# Patient Record
Sex: MALE | Race: WHITE | NOT HISPANIC OR LATINO | Employment: OTHER | URBAN - METROPOLITAN AREA
[De-identification: names, ages, dates, MRNs, and addresses within clinical notes are randomized per-mention and may not be internally consistent; named-entity substitution may affect disease eponyms.]

---

## 2017-06-09 ENCOUNTER — OFFICE VISIT (OUTPATIENT)
Dept: FAMILY MEDICINE | Facility: CLINIC | Age: 52
End: 2017-06-09
Payer: COMMERCIAL

## 2017-06-09 VITALS
HEART RATE: 75 BPM | DIASTOLIC BLOOD PRESSURE: 70 MMHG | HEIGHT: 70 IN | WEIGHT: 215.6 LBS | TEMPERATURE: 98.2 F | SYSTOLIC BLOOD PRESSURE: 102 MMHG | BODY MASS INDEX: 30.86 KG/M2

## 2017-06-09 DIAGNOSIS — Z12.11 SCREENING FOR COLON CANCER: Primary | ICD-10-CM

## 2017-06-09 DIAGNOSIS — Z12.5 SCREENING FOR PROSTATE CANCER: ICD-10-CM

## 2017-06-09 DIAGNOSIS — Z00.00 ROUTINE GENERAL MEDICAL EXAMINATION AT A HEALTH CARE FACILITY: ICD-10-CM

## 2017-06-09 DIAGNOSIS — Z11.59 NEED FOR HEPATITIS C SCREENING TEST: ICD-10-CM

## 2017-06-09 PROCEDURE — 99396 PREV VISIT EST AGE 40-64: CPT | Performed by: FAMILY MEDICINE

## 2017-06-09 NOTE — NURSING NOTE
"Chief Complaint   Patient presents with     Physical       Initial /70  Pulse 75  Temp 98.2  F (36.8  C) (Tympanic)  Ht 5' 10.47\" (1.79 m)  Wt 215 lb 9.6 oz (97.8 kg)  BMI 30.52 kg/m2 Estimated body mass index is 30.52 kg/(m^2) as calculated from the following:    Height as of this encounter: 5' 10.47\" (1.79 m).    Weight as of this encounter: 215 lb 9.6 oz (97.8 kg).  Medication Reconciliation: complete     Lissette Kaminski CMA      "

## 2017-06-09 NOTE — PROGRESS NOTES
SUBJECTIVE:     CC: Cristian Escobar is an 51 year old male who presents for preventative health visit.     Healthy Habits:    Do you get at least three servings of calcium containing foods daily (dairy, green leafy vegetables, etc.)? yes    Amount of exercise or daily activities, outside of work: 5 day(s) per week    Problems taking medications regularly No    Medication side effects: No    Have you had an eye exam in the past two years? yes    Do you see a dentist twice per year? yes    Do you have sleep apnea, excessive snoring or daytime drowsiness?no        Pt having irregularity in bowel movements. Pt states he was in [facility] in march and had diarrhea for 10 days.     Today's PHQ-2 Score:   PHQ-2 ( 1999 Pfizer) 6/9/2017   Q1: Little interest or pleasure in doing things 0   Q2: Feeling down, depressed or hopeless 0   PHQ-2 Score 0       Abuse: Current or Past(Physical, Sexual or Emotional)- No  Do you feel safe in your environment - Yes    Social History   Substance Use Topics     Smoking status: Never Smoker     Smokeless tobacco: Never Used     Alcohol use Yes     The patient does not drink >3 drinks per day nor >7 drinks per week.    Last PSA: No results found for: PSA    Recent Labs   Lab Test  07/27/10   0906  05/20/10   1357   CHOL  172  178   HDL  36*  38*   LDL  116   --    TRIG  100   --    CHOLHDLRATIO  5.0   --        Reviewed orders with patient. Reviewed health maintenance and updated orders accordingly - Yes    Reviewed and updated as needed this visit by clinical staff  Tobacco  Allergies  Meds  Med Hx  Surg Hx  Fam Hx  Soc Hx        Reviewed and updated as needed this visit by Provider            ROS:  Constitutional, neuro, EMT, endocrine, pulmonary, cardiac, gastrointestinal, genitourinary, musculoskeletal, integument and psychiatric systems are otherwise negative.     Problem list, Medication list, Allergies, and Medical/Social/Surgical histories reviewed in EPIC and updated as  "appropriate.  OBJECTIVE:     /70  Pulse 75  Temp 98.2  F (36.8  C) (Tympanic)  Ht 5' 10.47\" (1.79 m)  Wt 215 lb 9.6 oz (97.8 kg)  BMI 30.52 kg/m2  EXAM:  GENERAL: healthy, alert and no distress  NECK: no adenopathy, no asymmetry, masses, or scars and thyroid normal to palpation  RESP: lungs clear to auscultation - no rales, rhonchi or wheezes  CV: regular rate and rhythm, normal S1 S2, no S3 or S4, no murmur, click or rub, no peripheral edema and peripheral pulses strong  ABDOMEN: soft, nontender, no hepatosplenomegaly, no masses and bowel sounds normal  MS: no gross musculoskeletal defects noted, no edema  SKIN: no suspicious lesions or rashes  PSYCH: mentation appears normal, affect normal/bright  LYMPH: no cervical, supraclavicular, axillary, or inguinal adenopathy    ASSESSMENT/PLAN:         ICD-10-CM    1. Screening for colon cancer Z12.11 GASTROENTEROLOGY ADULT REF PROCEDURE ONLY     GASTROENTEROLOGY ADULT REF PROCEDURE ONLY   2. Routine general medical examination at a health care facility Z00.00 GLUCOSE     GASTROENTEROLOGY ADULT REF PROCEDURE ONLY     Hepatitis C Screen Reflex to HCV RNA Quant and Genotype     **Lipid panel reflex to direct LDL FUTURE anytime   3. Need for hepatitis C screening test Z11.59        COUNSELING:  Reviewed preventive health counseling, as reflected in patient instructions       reports that he has never smoked. He has never used smokeless tobacco.    Estimated body mass index is 30.52 kg/(m^2) as calculated from the following:    Height as of this encounter: 5' 10.47\" (1.79 m).    Weight as of this encounter: 215 lb 9.6 oz (97.8 kg).   Weight management plan: Discussed healthy diet and exercise guidelines and patient will follow up in 12 months in clinic to re-evaluate.    Counseling Resources:  ATP IV Guidelines  Pooled Cohorts Equation Calculator  FRAX Risk Assessment  ICSI Preventive Guidelines  Dietary Guidelines for Americans, 2010  USDA's MyPlate  ASA " Prophylaxis  Lung CA Screening    Monterll Guajardo MD  [facility]

## 2017-06-09 NOTE — MR AVS SNAPSHOT
After Visit Summary   6/9/2017    Cristian Escobar    MRN: [id number]           Patient Information     Date Of Birth          1965        Visit Information        Provider Department      6/9/2017 1:30 PM Montrell Guajardo MD [facility]        Today's Diagnoses     Screening for colon cancer    -  1    Routine general medical examination at a health care facility        Need for hepatitis C screening test          Care Instructions      Preventive Health Recommendations  Male Ages 50 - 64    Yearly exam:             See your health care provider every year in order to  o   Review health changes.   o   Discuss preventive care.    o   Review your medicines if your doctor has prescribed any.     Have a cholesterol test every 5 years, or more frequently if you are at risk for high cholesterol/heart disease.     Have a diabetes test (fasting glucose) every three years. If you are at risk for diabetes, you should have this test more often.     Have a colonoscopy at age 50, or have a yearly FIT test (stool test). These exams will check for colon cancer.      Talk with your health care provider about whether or not a prostate cancer screening test (PSA) is right for you.    You should be tested each year for STDs (sexually transmitted diseases), if you re at risk.     Shots: Get a flu shot each year. Get a tetanus shot every 10 years.     Nutrition:    Eat at least 5 servings of fruits and vegetables daily.     Eat whole-grain bread, whole-wheat pasta and brown rice instead of white grains and rice.     Talk to your provider about Calcium and Vitamin D.     Lifestyle    Exercise for at least 150 minutes a week (30 minutes a day, 5 days a week). This will help you control your weight and prevent disease.     Limit alcohol to one drink per day.     No smoking.     Wear sunscreen to prevent skin cancer.     See your dentist every six months for an exam and cleaning.     See your eye doctor  every 1 to 2 years.            Follow-ups after your visit        Additional Services     GASTROENTEROLOGY ADULT REF PROCEDURE ONLY           GASTROENTEROLOGY ADULT REF PROCEDURE ONLY       Last Lab Result: No results found for: CR  Body mass index is 30.52 kg/(m^2).      Patient will be contacted to schedule procedure.     Please be aware that coverage of these services is subject to the terms and limitations of your health insurance plan.  Call member services at your health plan with any benefit or coverage questions.  Any procedures must be performed at a [facility] facility OR coordinated by your clinic's referral office.    Please bring the following with you to your appointment:    (1) Any X-Rays, CTs or MRIs which have been performed.  Contact the facility where they were done to arrange for  prior to your scheduled appointment.    (2) List of current medications   (3) This referral request   (4) Any documents/labs given to you for this referral                  Future tests that were ordered for you today     Open Future Orders        Priority Expected Expires Ordered    GLUCOSE Routine  6/9/2018 6/9/2017    Hepatitis C Screen Reflex to HCV RNA Quant and Genotype Routine  6/9/2018 6/9/2017    **Lipid panel reflex to direct LDL FUTURE anytime Routine 6/9/2017 6/9/2018 6/9/2017            Who to contact     Normal or non-critical lab and imaging results will be communicated to you by MyChart, letter or phone within 4 business days after the clinic has received the results. If you do not hear from us within 7 days, please contact the clinic through MyChart or phone. If you have a critical or abnormal lab result, we will notify you by phone as soon as possible.  Submit refill requests through [employer] or call your pharmacy and they will forward the refill request to us. Please allow 3 business days for your refill to be completed.          If you need to speak with a  for additional  "information , please call: [phone]           Additional Information About Your Visit        MyChart Information     [facility] gives you secure access to your electronic health record. If you see a primary care provider, you can also send messages to your care team and make appointments. If you have questions, please call your primary care clinic.  If you do not have a primary care provider, please call [phone] and they will assist you.        Care EveryWhere ID     This is your Care EveryWhere ID. This could be used by other organizations to access your [place] medical records  [id number]        Your Vitals Were     Pulse Temperature Height BMI (Body Mass Index)          75 98.2  F (36.8  C) (Tympanic) 5' 10.47\" (1.79 m) 30.52 kg/m2         Blood Pressure from Last 3 Encounters:   06/09/17 102/70   02/28/12 98/62   02/23/12 100/64    Weight from Last 3 Encounters:   06/09/17 215 lb 9.6 oz (97.8 kg)   02/28/12 206 lb (93.4 kg)   02/23/12 204 lb 6 oz (92.7 kg)              We Performed the Following     GASTROENTEROLOGY ADULT REF PROCEDURE ONLY     GASTROENTEROLOGY ADULT REF PROCEDURE ONLY        Primary Care Provider Office Phone # Fax #    Caesar Wise [phone]       [facility] [address]   [place] [address]        Thank you!     Thank you for choosing [facility]  for your care. Our goal is always to provide you with excellent care. Hearing back from our patients is one way we can continue to improve our services. Please take a few minutes to complete the written survey that you may receive in the mail after your visit with us. Thank you!             Your Updated Medication List - Protect others around you: Learn how to safely use, store and throw away your medicines at www.disposemymeds.org.          This list is accurate as of: 6/9/17  2:27 PM.  Always use your most recent med list.                   Brand Name Dispense Instructions for use    " ibuprofen 200 MG tablet    ADVIL/MOTRIN     2-3 tablets 2 times per day

## 2017-06-14 DIAGNOSIS — Z00.00 ROUTINE GENERAL MEDICAL EXAMINATION AT A HEALTH CARE FACILITY: ICD-10-CM

## 2017-06-14 DIAGNOSIS — Z12.5 SCREENING FOR PROSTATE CANCER: ICD-10-CM

## 2017-06-14 LAB
CHOLEST SERPL-MCNC: 200 MG/DL
GLUCOSE SERPL-MCNC: 89 MG/DL (ref 70–99)
HCV AB SERPL QL IA: NORMAL
HDLC SERPL-MCNC: 46 MG/DL
LDLC SERPL CALC-MCNC: 144 MG/DL
NONHDLC SERPL-MCNC: 154 MG/DL
PSA SERPL-ACNC: 0.94 UG/L (ref 0–4)
TRIGL SERPL-MCNC: 48 MG/DL

## 2017-06-14 PROCEDURE — 86803 HEPATITIS C AB TEST: CPT | Performed by: FAMILY MEDICINE

## 2017-06-14 PROCEDURE — 80061 LIPID PANEL: CPT | Performed by: FAMILY MEDICINE

## 2017-06-14 PROCEDURE — 82947 ASSAY GLUCOSE BLOOD QUANT: CPT | Performed by: FAMILY MEDICINE

## 2017-06-14 PROCEDURE — 36415 COLL VENOUS BLD VENIPUNCTURE: CPT | Performed by: FAMILY MEDICINE

## 2017-06-14 PROCEDURE — G0103 PSA SCREENING: HCPCS | Performed by: FAMILY MEDICINE

## 2017-06-14 NOTE — LETTER
[facility]  [address] [place]  [address]  [phone]      June 21, 2017      Cristian Escobar  [address] [address]              Dear Mr. Escobar,    Your LDL (bad cholesterol)  was above goal.  Genetics, diet, weight and low exercise levels can contribute to this. Your HDL (good cholesterol) was normal.  This is good. Your triglycerides were normal. You need to recheck fasting labs yearly.     Your screening for the hepatitis C virus was normal/negative.  This is recommended as a one time test for everyone born between 1945 and 1965 of if you have other risk factors.     Your PSA is normal.  This indicates a reduced likelihood of prostate cancer.  Prostate cancer screening is controversial and not universally recommended.  For patients who choose to screen I recommend that this is done every 1-2 years if over 50.  For men with a family history of prostate cancer or -American men, I recommend doing this yearly at 40.     Your glucose is normal.  This indicates a low likelihood of diabetes.  You can prevent diabetes through adopting a healthy lifestyle with regular exercise, a well rounded diet and maintenance of a healthy weight.  You should be retested every 1-2 years for this.     Please contact the clinic if you have additional questions.  Thank you.       Sincerely,    Montrell Guajardo MD/JUANA CMA

## 2017-06-17 NOTE — PROGRESS NOTES
Mr. Escobar,    Your LDL (bad cholesterol)  was above goal.  Genetics, diet, weight and low exercise levels can contribute to this. Your HDL (good cholesterol) was normal.  This is good. Your triglycerides were normal. You need to recheck fasting labs yearly.    Your screening for the hepatitis C virus was normal/negative.  This is recommended as a one time test for everyone born between 1945 and 1965 of if you have other risk factors.    Your PSA is normal.  This indicates a reduced likelihood of prostate cancer.  Prostate cancer screening is controversial and not universally recommended.  For patients who choose to screen I recommend that this is done every 1-2 years if over 50.  For men with a family history of prostate cancer or -American men, I recommend doing this yearly at 40.    Your glucose is normal.  This indicates a low likelihood of diabetes.  You can prevent diabetes through adopting a healthy lifestyle with regular exercise, a well rounded diet and maintenance of a healthy weight.  You should be retested every 1-2 years for this.     Please contact the clinic if you have additional questions.  Thank you.    Sincerely,    Shoaib Guajardo MD

## 2017-08-29 ENCOUNTER — ANESTHESIA EVENT (OUTPATIENT)
Dept: GASTROENTEROLOGY | Facility: CLINIC | Age: 52
End: 2017-08-29
Payer: COMMERCIAL

## 2017-08-29 NOTE — ANESTHESIA PREPROCEDURE EVALUATION
Anesthesia Evaluation     . Pt has not had prior anesthetic            ROS/MED HX    ENT/Pulmonary:  - neg pulmonary ROS     Neurologic:  - neg neurologic ROS     Cardiovascular:  - neg cardiovascular ROS       METS/Exercise Tolerance:  >4 METS   Hematologic:  - neg hematologic  ROS       Musculoskeletal:  - neg musculoskeletal ROS       GI/Hepatic:  - neg GI/hepatic ROS       Renal/Genitourinary:  - ROS Renal section negative       Endo:  - neg endo ROS       Psychiatric:  - neg psychiatric ROS       Infectious Disease:  - neg infectious disease ROS       Malignancy:      - no malignancy   Other:    - neg other ROS                 Physical Exam  Normal systems: cardiovascular, pulmonary and dental    Airway   Mallampati: II  TM distance: >3 FB  Neck ROM: full    Dental     Cardiovascular       Pulmonary                     Anesthesia Plan      History & Physical Review      ASA Status:  1 .    NPO Status:  > 4 hours    Plan for MAC Reason for MAC:  Deep or markedly invasive procedure (G8)         Postoperative Care      Consents  Anesthetic plan, risks, benefits and alternatives discussed with:  Patient..                          .

## 2017-09-05 ENCOUNTER — SURGERY (OUTPATIENT)
Age: 52
End: 2017-09-05

## 2017-09-05 ENCOUNTER — ANESTHESIA (OUTPATIENT)
Dept: GASTROENTEROLOGY | Facility: CLINIC | Age: 52
End: 2017-09-05
Payer: COMMERCIAL

## 2017-09-05 ENCOUNTER — HOSPITAL ENCOUNTER (OUTPATIENT)
Facility: CLINIC | Age: 52
Discharge: HOME | End: 2017-09-05
Attending: SURGERY | Admitting: SURGERY
Payer: COMMERCIAL

## 2017-09-05 VITALS
DIASTOLIC BLOOD PRESSURE: 75 MMHG | HEART RATE: 60 BPM | RESPIRATION RATE: 16 BRPM | OXYGEN SATURATION: 98 % | TEMPERATURE: 97.6 F | BODY MASS INDEX: 26.41 KG/M2 | WEIGHT: 195 LBS | HEIGHT: 72 IN | SYSTOLIC BLOOD PRESSURE: 110 MMHG

## 2017-09-05 LAB — COLONOSCOPY: NORMAL

## 2017-09-05 PROCEDURE — 37000009 ZZH ANESTHESIA TECHNICAL FEE, EACH ADDTL 15 MIN: Performed by: SURGERY

## 2017-09-05 PROCEDURE — G0121 COLON CA SCRN NOT HI RSK IND: HCPCS | Performed by: SURGERY

## 2017-09-05 PROCEDURE — 25000128 H RX IP 250 OP 636: Performed by: SURGERY

## 2017-09-05 PROCEDURE — 25000128 H RX IP 250 OP 636: Performed by: NURSE ANESTHETIST, CERTIFIED REGISTERED

## 2017-09-05 PROCEDURE — 25000125 ZZHC RX 250: Performed by: NURSE ANESTHETIST, CERTIFIED REGISTERED

## 2017-09-05 PROCEDURE — 25000125 ZZHC RX 250: Performed by: SURGERY

## 2017-09-05 PROCEDURE — 45378 DIAGNOSTIC COLONOSCOPY: CPT | Performed by: SURGERY

## 2017-09-05 PROCEDURE — 37000008 ZZH ANESTHESIA TECHNICAL FEE, 1ST 30 MIN: Performed by: SURGERY

## 2017-09-05 RX ORDER — PROPOFOL 10 MG/ML
INJECTION, EMULSION INTRAVENOUS PRN
Status: DISCONTINUED | OUTPATIENT
Start: 2017-09-05 | End: 2017-09-05

## 2017-09-05 RX ORDER — PROPOFOL 10 MG/ML
INJECTION, EMULSION INTRAVENOUS CONTINUOUS PRN
Status: DISCONTINUED | OUTPATIENT
Start: 2017-09-05 | End: 2017-09-05

## 2017-09-05 RX ORDER — LIDOCAINE 40 MG/G
CREAM TOPICAL
Status: DISCONTINUED | OUTPATIENT
Start: 2017-09-05 | End: 2017-09-05 | Stop reason: HOSPADM

## 2017-09-05 RX ORDER — ONDANSETRON 2 MG/ML
4 INJECTION INTRAMUSCULAR; INTRAVENOUS
Status: DISCONTINUED | OUTPATIENT
Start: 2017-09-05 | End: 2017-09-05 | Stop reason: HOSPADM

## 2017-09-05 RX ORDER — GLYCOPYRROLATE 0.2 MG/ML
INJECTION, SOLUTION INTRAMUSCULAR; INTRAVENOUS PRN
Status: DISCONTINUED | OUTPATIENT
Start: 2017-09-05 | End: 2017-09-05

## 2017-09-05 RX ORDER — SODIUM CHLORIDE, SODIUM LACTATE, POTASSIUM CHLORIDE, CALCIUM CHLORIDE 600; 310; 30; 20 MG/100ML; MG/100ML; MG/100ML; MG/100ML
INJECTION, SOLUTION INTRAVENOUS CONTINUOUS
Status: DISCONTINUED | OUTPATIENT
Start: 2017-09-05 | End: 2017-09-05 | Stop reason: HOSPADM

## 2017-09-05 RX ORDER — LIDOCAINE HYDROCHLORIDE 10 MG/ML
INJECTION, SOLUTION EPIDURAL; INFILTRATION; INTRACAUDAL; PERINEURAL PRN
Status: DISCONTINUED | OUTPATIENT
Start: 2017-09-05 | End: 2017-09-05

## 2017-09-05 RX ADMIN — PROPOFOL 200 MCG/KG/MIN: 10 INJECTION, EMULSION INTRAVENOUS at 07:31

## 2017-09-05 RX ADMIN — LIDOCAINE HYDROCHLORIDE 1 ML: 10 INJECTION, SOLUTION EPIDURAL; INFILTRATION; INTRACAUDAL; PERINEURAL at 06:54

## 2017-09-05 RX ADMIN — GLYCOPYRROLATE 0.2 MG: 0.2 INJECTION, SOLUTION INTRAMUSCULAR; INTRAVENOUS at 07:30

## 2017-09-05 RX ADMIN — SODIUM CHLORIDE, POTASSIUM CHLORIDE, SODIUM LACTATE AND CALCIUM CHLORIDE: 600; 310; 30; 20 INJECTION, SOLUTION INTRAVENOUS at 06:54

## 2017-09-05 RX ADMIN — LIDOCAINE HYDROCHLORIDE 50 MG: 10 INJECTION, SOLUTION EPIDURAL; INFILTRATION; INTRACAUDAL; PERINEURAL at 07:30

## 2017-09-05 RX ADMIN — PROPOFOL 100 MG: 10 INJECTION, EMULSION INTRAVENOUS at 07:31

## 2017-09-05 NOTE — ANESTHESIA CARE TRANSFER NOTE
Patient: Cristian Escobar    Procedure(s):  Colonoscopy - Wound Class: II-Clean Contaminated    Diagnosis: screening  Diagnosis Additional Information: No value filed.    Anesthesia Type:   MAC     Note:  Airway :Room Air  Patient transferred to:Phase II        Vitals: (Last set prior to Anesthesia Care Transfer)    CRNA VITALS  9/5/2017 0728 - 9/5/2017 0758      9/5/2017             Pulse: 59    SpO2: 97 %                Electronically Signed By: TAIWO Martínez CRNA  September 5, 2017  7:58 AM

## 2017-09-05 NOTE — H&P
52 year old year old male here for colonoscopy for screening.    Patient Active Problem List   Diagnosis     CARDIOVASCULAR SCREENING; LDL GOAL LESS THAN 130     24 hour contact handout given       No past medical history on file.    No past surgical history on file.    @[facility]@    No current outpatient prescriptions on file.       No Known Allergies    Pt reports that he has never smoked. He has never used smokeless tobacco. He reports that he drinks alcohol. He reports that he does not use illicit drugs.    Exam:  /73  Temp 97.6  F (36.4  C) (Oral)  Resp 16  Ht 1.829 m (6')  Wt 88.5 kg (195 lb)  SpO2 97%  BMI 26.45 kg/m2    Awake, Alert OX3  Lungs - CTA bilaterally  CV - RRR, no murmurs, distal pulses intact  Abd - soft, non-distended, non-tender, +BS  Extr - No cyanosis or edema    A/P 52 year old year old male in need of colonoscopy for screening. Risks, benefits, alternatives, and complications were discussed including the possibility of perforation and the patient agreed to proceed    Matthew Kent MD

## 2017-09-05 NOTE — BRIEF OP NOTE
[facility]   Brief Operative Note    Pre-operative diagnosis: screening   Post-operative diagnosis normal colon   Procedure: Procedure(s):  Colonoscopy - Wound Class: II-Clean Contaminated   Surgeon(s): Surgeon(s) and Role:     * Matthew Kent MD - Primary   Estimated blood loss: * No values recorded between 9/5/2017 12:00 AM and 9/5/2017  7:53 AM *    Specimens: * No specimens in log *   Findings: Normal colon, external skin tags

## 2017-09-05 NOTE — ANESTHESIA POSTPROCEDURE EVALUATION
Patient: Cristian Escobar    Procedure(s):  Colonoscopy - Wound Class: II-Clean Contaminated    Diagnosis:screening  Diagnosis Additional Information: No value filed.    Anesthesia Type:  MAC    Note:  Anesthesia Post Evaluation    Patient location during evaluation: Bedside  Patient participation: Able to fully participate in evaluation  Level of consciousness: awake and alert  Pain management: adequate  Airway patency: patent  Cardiovascular status: acceptable  Respiratory status: acceptable  Hydration status: acceptable  PONV: none     Anesthetic complications: None          Last vitals:  Vitals:    09/05/17 0632   BP: 105/73   Resp: 16   Temp: 36.4  C (97.6  F)   SpO2: 97%         Electronically Signed By: TAIWO Martínez CRNA  September 5, 2017  7:58 AM

## 2019-11-03 ENCOUNTER — HEALTH MAINTENANCE LETTER (OUTPATIENT)
Age: 54
End: 2019-11-03

## 2020-11-16 ENCOUNTER — HEALTH MAINTENANCE LETTER (OUTPATIENT)
Age: 55
End: 2020-11-16

## 2021-09-18 ENCOUNTER — HEALTH MAINTENANCE LETTER (OUTPATIENT)
Age: 56
End: 2021-09-18

## 2022-01-08 ENCOUNTER — HEALTH MAINTENANCE LETTER (OUTPATIENT)
Age: 57
End: 2022-01-08

## 2022-11-19 ENCOUNTER — HEALTH MAINTENANCE LETTER (OUTPATIENT)
Age: 57
End: 2022-11-19

## 2023-04-15 ENCOUNTER — HEALTH MAINTENANCE LETTER (OUTPATIENT)
Age: 58
End: 2023-04-15

## 2024-01-16 ENCOUNTER — OFFICE VISIT (OUTPATIENT)
Dept: FAMILY MEDICINE | Facility: CLINIC | Age: 59
End: 2024-01-16
Payer: COMMERCIAL

## 2024-01-16 VITALS
BODY MASS INDEX: 28.62 KG/M2 | RESPIRATION RATE: 16 BRPM | TEMPERATURE: 97.6 F | DIASTOLIC BLOOD PRESSURE: 66 MMHG | HEART RATE: 84 BPM | SYSTOLIC BLOOD PRESSURE: 110 MMHG | WEIGHT: 211 LBS | OXYGEN SATURATION: 99 %

## 2024-01-16 DIAGNOSIS — L02.91 ABSCESS: Primary | ICD-10-CM

## 2024-01-16 DIAGNOSIS — D17.9 LIPOMA, UNSPECIFIED SITE: ICD-10-CM

## 2024-01-16 PROCEDURE — 87205 SMEAR GRAM STAIN: CPT | Performed by: STUDENT IN AN ORGANIZED HEALTH CARE EDUCATION/TRAINING PROGRAM

## 2024-01-16 PROCEDURE — 87077 CULTURE AEROBIC IDENTIFY: CPT | Performed by: STUDENT IN AN ORGANIZED HEALTH CARE EDUCATION/TRAINING PROGRAM

## 2024-01-16 PROCEDURE — 10060 I&D ABSCESS SIMPLE/SINGLE: CPT | Performed by: STUDENT IN AN ORGANIZED HEALTH CARE EDUCATION/TRAINING PROGRAM

## 2024-01-16 PROCEDURE — 87070 CULTURE OTHR SPECIMN AEROBIC: CPT | Performed by: STUDENT IN AN ORGANIZED HEALTH CARE EDUCATION/TRAINING PROGRAM

## 2024-01-16 PROCEDURE — 99202 OFFICE O/P NEW SF 15 MIN: CPT | Mod: 25 | Performed by: STUDENT IN AN ORGANIZED HEALTH CARE EDUCATION/TRAINING PROGRAM

## 2024-01-16 RX ORDER — CEPHALEXIN 500 MG/1
500 CAPSULE ORAL 2 TIMES DAILY
Qty: 14 CAPSULE | Refills: 0 | Status: SHIPPED | OUTPATIENT
Start: 2024-01-16

## 2024-01-16 ASSESSMENT — PAIN SCALES - GENERAL: PAINLEVEL: NO PAIN (0)

## 2024-01-16 NOTE — PROGRESS NOTES
SUBJECTIVE:  58 year old male presents with abscess formation on right lower back, approximately 7 cm in diameter for 14+ days. No  fever or chills.  No history of diabetes.    OBJECTIVE:  Fluctuant abscess noted on the right lower back.  Size 7 cm in diameter. There is   surrounding induration, erythema and tenderness. (+) Small amounts of purulent drainage with pressure.   (+) Surrounding cellulitis.  Afebrile.    ASSESSMENT:  Infected lipoma with abscess formation    PLAN:  After informed consent was obtained, using Betadine for cleansing   and 1% Lidocaine  with epinephrine for anesthetic, with sterile   technique, an incision was made into the abscess cavity which was   then drained of purulent material.  The cavity was irrigated. Culture was obtained. Procedure well   tolerated.  Dressing applied and wound care instructions provided.   No packing was sent today.  Continue frequent warm tub soaks until abscess resolves. Return to clinic prn for pain, increased swelling or fever.    Sent home with Keflex

## 2024-01-16 NOTE — PROGRESS NOTES
Assessment & Plan   Problem List Items Addressed This Visit    None  Visit Diagnoses       Abscess    -  Primary    Relevant Medications    cephALEXin (KEFLEX) 500 MG capsule    Other Relevant Orders    Abscess Aerobic Bacterial Culture Routine With Gram Stain    Lipoma, unspecified site        Relevant Orders    Adult Dermatology  Referral             Exam consistent with infected lipoma with abscess formation + cellulitis.  I&D was performed without any difficulty (see procedure note).  Was also sent home with Keflex and wound care instructions.  Patient was also given a referral to dermatology for excision of the lipoma in the future    42 minutes spent by me on the date of the encounter doing chart review, history and exam, documentation and further activities per the note    30 minutes out of the total 42 minutes were spent on I&D of the abscess.        Johanna Coley DO  [facility] JOVITA Foster is a 58 year old, presenting for the following health issues:  cyst  (Mid back, has had a cyst for about 3 years. )      1/16/2024    11:00 AM   Additional Questions   Roomed by mich     Patient had surrounding cellulitis  Patient has history of lipomas.  Had a new lipoma that formed during COVID on his low back.  Was getting bigger for the last few years but acutely, over the last few weeks, started to become red and swollen and warm to the touch.  A few days ago, it started to drain pus  Patient has no fevers, chills  The area is quite tender and it is making it difficult for him to sleep  As needed I&D and removal of lipomas in the past    Review of Systems   As per HPI       Objective    /66 (BP Location: Right arm, Patient Position: Sitting)   Pulse 84   Temp 97.6  F (36.4  C) (Oral)   Resp 16   Wt 95.7 kg (211 lb)   SpO2 99%   BMI 28.62 kg/m    Body mass index is 28.62 kg/m .  Physical Exam

## 2024-01-16 NOTE — COMMUNITY RESOURCES LIST (ENGLISH)
01/16/2024   [facility] [place]  N/A  For questions about this resource list or additional care needs, please contact your primary care clinic or care manager.  Phone: [phone]   Email: N/A   Address: [address] [place] [id number]   Hours: N/A        Financial Stability       Utility payment assistance  1  [facility] [employer] ([employer]) - Energy Assistance Program Distance: 7.38 miles      In-Person, Phone/Virtual   [address] [place] [address]  Language: English  Hours: Mon - Fri 8:00 AM - 4:30 PM  Fees: Free   Phone: (934) 126-7366 Email: accap@accap.org Website: http://www.accap.org     2  [facility]  Office - [facility] Distance: 7.4 miles      Phone/Virtual   [address] [place] [address]  Language: English  Hours: Mon - Fri 8:30 AM - 12:00 PM , Mon - Fri 1:00 PM - 4:00 PM  Fees: Free   Phone: (408) 773-7996 Email: porfirio@[facility].org Website: https://www.[employer]ationarmyusa.org/usn/          Important Numbers & Websites       Emergency Services   911  [facility] Services   311  Poison Control   (648) 749-7779  Suicide Prevention Lifeline   (472) 515-7364 (TALK)  Child Abuse Hotline   (590) 829-6902 (4-A-Child)  Sexual Assault Hotline   (338) 198-1540 (HOPE)  National Runaway Safeline   (997) 542-6024 (RUNAWAY)  All-Options Talkline   (941) 975-6110  Substance Abuse Referral   (910) 441-6612 (HELP)

## 2024-01-18 LAB
BACTERIA ABSC ANAEROBE+AEROBE CULT: ABNORMAL
GRAM STAIN RESULT: ABNORMAL
GRAM STAIN RESULT: ABNORMAL

## 2024-02-27 ENCOUNTER — TELEPHONE (OUTPATIENT)
Dept: FAMILY MEDICINE | Facility: CLINIC | Age: 59
End: 2024-02-27

## 2024-02-27 ENCOUNTER — OFFICE VISIT (OUTPATIENT)
Dept: FAMILY MEDICINE | Facility: CLINIC | Age: 59
End: 2024-02-27
Payer: COMMERCIAL

## 2024-02-27 VITALS
RESPIRATION RATE: 10 BRPM | BODY MASS INDEX: 30.02 KG/M2 | HEIGHT: 71 IN | TEMPERATURE: 98.4 F | OXYGEN SATURATION: 100 % | WEIGHT: 214.4 LBS

## 2024-02-27 DIAGNOSIS — R35.1 NOCTURIA: ICD-10-CM

## 2024-02-27 DIAGNOSIS — E78.2 MIXED HYPERLIPIDEMIA: ICD-10-CM

## 2024-02-27 DIAGNOSIS — E66.3 OVERWEIGHT: ICD-10-CM

## 2024-02-27 DIAGNOSIS — Z12.5 SCREENING FOR PROSTATE CANCER: ICD-10-CM

## 2024-02-27 DIAGNOSIS — R42 LIGHTHEADEDNESS: ICD-10-CM

## 2024-02-27 DIAGNOSIS — Z82.49 FAMILY HISTORY OF CORONARY ARTERY DISEASE: ICD-10-CM

## 2024-02-27 DIAGNOSIS — Z00.00 ROUTINE GENERAL MEDICAL EXAMINATION AT A HEALTH CARE FACILITY: Primary | ICD-10-CM

## 2024-02-27 LAB
ALBUMIN SERPL BCG-MCNC: 4.3 G/DL (ref 3.5–5.2)
ALP SERPL-CCNC: 93 U/L (ref 40–150)
ALT SERPL W P-5'-P-CCNC: 20 U/L (ref 0–70)
ANION GAP SERPL CALCULATED.3IONS-SCNC: 8 MMOL/L (ref 7–15)
AST SERPL W P-5'-P-CCNC: 21 U/L (ref 0–45)
ATRIAL RATE - MUSE: 57 BPM
BILIRUB SERPL-MCNC: 0.5 MG/DL
BUN SERPL-MCNC: 16.8 MG/DL (ref 8–23)
CALCIUM SERPL-MCNC: 9.6 MG/DL (ref 8.8–10.2)
CHLORIDE SERPL-SCNC: 101 MMOL/L (ref 98–107)
CHOLEST SERPL-MCNC: 216 MG/DL
CREAT SERPL-MCNC: 0.96 MG/DL (ref 0.67–1.17)
DEPRECATED HCO3 PLAS-SCNC: 29 MMOL/L (ref 22–29)
DIASTOLIC BLOOD PRESSURE - MUSE: NORMAL MMHG
EGFRCR SERPLBLD CKD-EPI 2021: 90 ML/MIN/1.73M2
ERYTHROCYTE [DISTWIDTH] IN BLOOD BY AUTOMATED COUNT: 12.4 % (ref 10–15)
FASTING STATUS PATIENT QL REPORTED: NO
GLUCOSE SERPL-MCNC: 82 MG/DL (ref 70–99)
HBA1C MFR BLD: 5.4 % (ref 0–5.6)
HCT VFR BLD AUTO: 42.4 % (ref 40–53)
HDLC SERPL-MCNC: 40 MG/DL
HGB BLD-MCNC: 14.4 G/DL (ref 13.3–17.7)
INTERPRETATION ECG - MUSE: NORMAL
LDLC SERPL CALC-MCNC: 151 MG/DL
MCH RBC QN AUTO: 30.6 PG (ref 26.5–33)
MCHC RBC AUTO-ENTMCNC: 34 G/DL (ref 31.5–36.5)
MCV RBC AUTO: 90 FL (ref 78–100)
NONHDLC SERPL-MCNC: 176 MG/DL
P AXIS - MUSE: 22 DEGREES
PLATELET # BLD AUTO: 273 10E3/UL (ref 150–450)
POTASSIUM SERPL-SCNC: 3.9 MMOL/L (ref 3.4–5.3)
PR INTERVAL - MUSE: 182 MS
PROT SERPL-MCNC: 7.2 G/DL (ref 6.4–8.3)
PSA SERPL DL<=0.01 NG/ML-MCNC: 0.91 NG/ML (ref 0–4.5)
QRS DURATION - MUSE: 90 MS
QT - MUSE: 432 MS
QTC - MUSE: 420 MS
R AXIS - MUSE: 26 DEGREES
RBC # BLD AUTO: 4.7 10E6/UL (ref 4.4–5.9)
SODIUM SERPL-SCNC: 138 MMOL/L (ref 135–145)
SYSTOLIC BLOOD PRESSURE - MUSE: NORMAL MMHG
T AXIS - MUSE: 31 DEGREES
T4 FREE SERPL-MCNC: 1.38 NG/DL (ref 0.9–1.7)
TRIGL SERPL-MCNC: 124 MG/DL
TSH SERPL DL<=0.005 MIU/L-ACNC: 4.84 UIU/ML (ref 0.3–4.2)
VENTRICULAR RATE- MUSE: 57 BPM
WBC # BLD AUTO: 5.6 10E3/UL (ref 4–11)

## 2024-02-27 PROCEDURE — 93010 ELECTROCARDIOGRAM REPORT: CPT | Performed by: INTERNAL MEDICINE

## 2024-02-27 PROCEDURE — 80053 COMPREHEN METABOLIC PANEL: CPT | Performed by: PHYSICIAN ASSISTANT

## 2024-02-27 PROCEDURE — G0103 PSA SCREENING: HCPCS | Performed by: PHYSICIAN ASSISTANT

## 2024-02-27 PROCEDURE — 99214 OFFICE O/P EST MOD 30 MIN: CPT | Mod: 25 | Performed by: PHYSICIAN ASSISTANT

## 2024-02-27 PROCEDURE — 36415 COLL VENOUS BLD VENIPUNCTURE: CPT | Performed by: PHYSICIAN ASSISTANT

## 2024-02-27 PROCEDURE — 85027 COMPLETE CBC AUTOMATED: CPT | Performed by: PHYSICIAN ASSISTANT

## 2024-02-27 PROCEDURE — 84443 ASSAY THYROID STIM HORMONE: CPT | Performed by: PHYSICIAN ASSISTANT

## 2024-02-27 PROCEDURE — 84439 ASSAY OF FREE THYROXINE: CPT | Performed by: PHYSICIAN ASSISTANT

## 2024-02-27 PROCEDURE — 93005 ELECTROCARDIOGRAM TRACING: CPT | Performed by: PHYSICIAN ASSISTANT

## 2024-02-27 PROCEDURE — 80061 LIPID PANEL: CPT | Performed by: PHYSICIAN ASSISTANT

## 2024-02-27 PROCEDURE — 99386 PREV VISIT NEW AGE 40-64: CPT | Mod: 25 | Performed by: PHYSICIAN ASSISTANT

## 2024-02-27 PROCEDURE — 83036 HEMOGLOBIN GLYCOSYLATED A1C: CPT | Performed by: PHYSICIAN ASSISTANT

## 2024-02-27 SDOH — HEALTH STABILITY: PHYSICAL HEALTH: ON AVERAGE, HOW MANY MINUTES DO YOU ENGAGE IN EXERCISE AT THIS LEVEL?: 30 MIN

## 2024-02-27 SDOH — HEALTH STABILITY: PHYSICAL HEALTH: ON AVERAGE, HOW MANY DAYS PER WEEK DO YOU ENGAGE IN MODERATE TO STRENUOUS EXERCISE (LIKE A BRISK WALK)?: 5 DAYS

## 2024-02-27 ASSESSMENT — SOCIAL DETERMINANTS OF HEALTH (SDOH): HOW OFTEN DO YOU GET TOGETHER WITH FRIENDS OR RELATIVES?: ONCE A WEEK

## 2024-02-27 NOTE — PROGRESS NOTES
Preventive Care Visit  [facility]  Ximena Howell PA-C, Physician Assistant - Medical  Feb 27, 2024    Assessment & Plan     Routine general medical examination at a health care facility  Overweight  Family history of coronary artery disease  Routine labs updated including prostate levels.  Vaccines- per patient up to date on COVID, recommend RSV at pharmacy.   Colonoscopy due next in 2027, last in 2017  Patient acknowledges his weight is higher than he would like it to be, will continue to work on healthy diet and exercise habits  Given family cardiac history and symptoms of tunnel vision with position changes, plan for Coronary Calcium scan for evaluation of cardiac perfusion. EKG done in office shows NSR. No ST segment changes.  Follow up in 1 year or sooner as needed  Parent understands and agrees with treatment and plan and had no further questions  - CBC with platelets  - Comprehensive metabolic panel  - Hemoglobin A1c  - Lipid panel reflex to direct LDL Fasting  - TSH with free T4 reflex  - TDAP 10-64Y (ADACEL,BOOSTRIX)  - PRIMARY CARE FOLLOW-UP SCHEDULING  - EKG 12-lead, tracing only  - CT Coronary Calcium Scan    Screening for prostate cancer  Nocturia  Frequency and difficulty starting and stopping urine stream  Waking up 2x every night to urinate  Plan for further evaluation of symptoms with the labs listed below  - Prostate Specific Antigen Screen  - Hemoglobin A1c    Lightheadedness  Experiencing tunnel vision with position changes over the last 5-10 years, more noticeable recently  Orthostatic blood pressure done today and within normal range. Dicussed importance of dangling at bedside at night. If worsening symptoms, chest pain, syncope occur follow up in clinic.     Patient has been advised of split billing requirements and indicates understanding: Yes          BMI  Estimated body mass index is 29.69 kg/m  as calculated from the following:    Height as of this  "encounter: 1.81 m (5' 11.25\").    Weight as of this encounter: 97.3 kg (214 lb 6.4 oz).       Counseling  Appropriate preventive services were discussed with this patient, including applicable screening as appropriate for fall prevention, nutrition, physical activity, Tobacco-use cessation, weight loss and cognition.  Checklist reviewing preventive services available has been given to the patient.  Reviewed patient's diet, addressing concerns and/or questions.   He is at risk for psychosocial distress and has been provided with information to reduce risk.       Work on weight loss  Regular exercise      Risks, benefits and alternatives were discussed with patient. Agreeable to the plan of care.    I have discussed the patient's presenting complaint(s) with the NP student and agree with the history, physical exam and plan as documented above. Her progress note reflects our assessment and plan. Patient was also independently seen by myself.    Uyen Foster is a 58 year old, presenting for the following:  Physical (Gets tunnel vision when going from laying to standing at night.  Would like heart eval.  Father had a heart attack and mother had strokes. )        2/27/2024     8:02 AM   Additional Questions   Roomed by DEANN Worthington CMA([facility])        Health Care Directive  Patient does not have a Health Care Directive or Living Will: Discussed advance care planning with patient; information given to patient to review.    JORDAN Escobar is a 58 year old male, previously seen by this provider, who presents for an annual wellness exam.    Has been experiencing intermittent \"tunnel vision\" for the last 5-10 years  In the last couple years has become more noticeable to him  Typically occurs at night when he gets up to use the bathroom when he changes position from laying flat to standing  Has never felt dizziness or \"blacked out\" from this. Does not feel faint  Lasts a couple seconds then spontaneously resolves by " the time he is in the bathroom  At times experiences this when he is playing tennis when he bends his body down to reach for the ball and then stands back up    He works for [employer] and is inquiring whether he needs a cardiac workup for his symptoms    Drinks 1-2 gallons of water per day    Very active. Does cardio and weight lifting exercises throughout the week. Plays tennis regularly      Has noticed frequency of urination during the night going to bathroom, 2x per night as he has gotten older.           2/27/2024   General Health   How would you rate your overall physical health? Good   Feel stress (tense, anxious, or unable to sleep) Only a little   (!) STRESS CONCERN      2/27/2024   Nutrition   Three or more servings of calcium each day? Yes   Diet: Regular (no restrictions)   How many servings of fruit and vegetables per day? (!) 2-3   How many sweetened beverages each day? 0-1         2/27/2024   Exercise   Days per week of moderate/strenous exercise 5 days   Average minutes spent exercising at this level 30 min         2/27/2024   Social Factors   Frequency of gathering with friends or relatives Once a week   Worry food won't last until get money to buy more No   Food not last or not have enough money for food? No   Do you have housing?  Yes   Are you worried about losing your housing? No   Lack of transportation? No   Unable to get utilities (heat,electricity)? No         2/27/2024   Fall Risk   Fallen 2 or more times in the past year? No   Trouble with walking or balance? No          2/27/2024   Dental   Dentist two times every year? Yes         2/27/2024   TB Screening   Were you born outside of [place]?  No               2/27/2024   Substance Use   Alcohol more than 3/day or more than 7/wk No   Do you use any other substances recreationally? No    (!) OTHER     Social History     Tobacco Use    Smoking status: Never     Passive exposure: Never    Smokeless tobacco: Never   Vaping Use    Vaping  "Use: Never used   Substance Use Topics    Alcohol use: Yes    Drug use: No             2/27/2024   One time HIV Screening   Previous HIV test? No         2/27/2024   STI Screening   New sexual partner(s) since last STI/HIV test? No   Last PSA:   PSA   Date Value Ref Range Status   06/14/2017 0.94 0 - 4 ug/L Final     Comment:     Assay Method:  Chemiluminescence using Siemens Vista analyzer     ASCVD Risk   The ASCVD Risk score (Breezy CASILLAS, et al., 2019) failed to calculate for the following reasons:    Cannot find a previous HDL lab    Cannot find a previous total cholesterol lab         Reviewed and updated as needed this visit by Provider   Tobacco  Allergies  Meds  Problems  Med Hx  Surg Hx  Fam Hx              Review of Systems  CONSTITUTIONAL: NEGATIVE for fever, chills, change in weight  INTEGUMENTARY/SKIN: NEGATIVE for worrisome rashes, moles or lesions  EYES: NEGATIVE for vision irritation. POSITIVE for vision changes with rapid position changes   ENT/MOUTH: NEGATIVE for ear, mouth and throat problems  RESP: NEGATIVE for significant cough or SOB  CV: NEGATIVE for chest pain, palpitations or peripheral edema  GI: NEGATIVE for nausea, abdominal pain, heartburn, or change in bowel habits  : NEGATIVE for hematuria. POSITIVE for frequency of urination during the night going to bathroom, 2x per night, decreased force, trouble keeping erection- has tried generic form of Viagra  MUSCULOSKELETAL: NEGATIVE for significant arthralgias or myalgia  NEURO: NEGATIVE for weakness, dizziness or paresthesias  ENDOCRINE: NEGATIVE for temperature intolerance, skin/hair changes  HEME: NEGATIVE for bleeding problems  PSYCHIATRIC: NEGATIVE for changes in mood or affect     Objective    Exam  /75   Pulse 67   Temp 98.4  F (36.9  C) (Oral)   Resp 10   Ht 1.81 m (5' 11.25\")   Wt 97.3 kg (214 lb 6.4 oz)   SpO2 100%   BMI 29.69 kg/m     Estimated body mass index is 29.69 kg/m  as calculated from the " "following:    Height as of this encounter: 1.81 m (5' 11.25\").    Weight as of this encounter: 97.3 kg (214 lb 6.4 oz).    Physical Exam  GENERAL: alert and no distress  EYES: Eyes grossly normal to inspection, PERRL and conjunctivae and sclerae normal  HENT: ear canals and TM's normal, nose and mouth without ulcers or lesions  NECK: no adenopathy, no asymmetry, masses, or scars  RESP: lungs clear to auscultation - no rales, rhonchi or wheezes  CV: regular rate and rhythm, normal S1 S2, no S3 or S4, no murmur, click or rub, no peripheral edema  ABDOMEN: soft, nontender, no hepatosplenomegaly, no masses and bowel sounds normal  MS: no gross musculoskeletal defects noted, no edema  SKIN: no suspicious lesions or rashes. Band-Aid over incision on right posterior lumbar area  NEURO: Normal strength and tone, mentation intact and speech normal  PSYCH: mentation appears normal, affect normal/bright        Signed Electronically by: Ximena Howell PA-C    "

## 2024-02-27 NOTE — TELEPHONE ENCOUNTER
LMTCB    Patient was seen this morning.  Tdap was ordered and not given.  Please see if he wants to come back today.  Otherwise he can set up a nurse only appointment or he could go to a pharmacy to have done as well.

## 2024-02-28 PROBLEM — E78.2 MIXED HYPERLIPIDEMIA: Status: ACTIVE | Noted: 2024-02-28

## 2024-02-28 PROBLEM — Z82.49 FAMILY HISTORY OF CORONARY ARTERY DISEASE: Status: ACTIVE | Noted: 2024-02-28

## 2024-02-28 PROBLEM — E66.3 OVERWEIGHT: Status: ACTIVE | Noted: 2024-02-28

## 2024-02-28 RX ORDER — ATORVASTATIN CALCIUM 20 MG/1
20 TABLET, FILM COATED ORAL DAILY
Qty: 90 TABLET | Refills: 3 | Status: SHIPPED | OUTPATIENT
Start: 2024-02-28

## 2024-03-02 ENCOUNTER — MYC MEDICAL ADVICE (OUTPATIENT)
Dept: FAMILY MEDICINE | Facility: CLINIC | Age: 59
End: 2024-03-02
Payer: COMMERCIAL

## 2024-03-04 NOTE — TELEPHONE ENCOUNTER
Spoke with patient regarding cholesterol medication, he would prefer lifestyle changes and monitoring. Would also like to see coronary calcium CT study results.    Aware of cardiac risks.    Ximena Howell PA-C

## 2024-07-08 ENCOUNTER — HOSPITAL ENCOUNTER (OUTPATIENT)
Dept: CT IMAGING | Facility: CLINIC | Age: 59
Discharge: HOME | End: 2024-07-08
Attending: PHYSICIAN ASSISTANT | Admitting: PHYSICIAN ASSISTANT
Payer: COMMERCIAL

## 2024-07-08 DIAGNOSIS — Z00.00 ROUTINE GENERAL MEDICAL EXAMINATION AT A HEALTH CARE FACILITY: ICD-10-CM

## 2024-07-08 DIAGNOSIS — Z82.49 FAMILY HISTORY OF CORONARY ARTERY DISEASE: ICD-10-CM

## 2024-07-08 PROCEDURE — 75571 CT HRT W/O DYE W/CA TEST: CPT

## 2024-07-08 PROCEDURE — 75571 CT HRT W/O DYE W/CA TEST: CPT | Mod: 26 | Performed by: STUDENT IN AN ORGANIZED HEALTH CARE EDUCATION/TRAINING PROGRAM

## 2024-07-09 ENCOUNTER — TELEPHONE (OUTPATIENT)
Dept: FAMILY MEDICINE | Facility: CLINIC | Age: 59
End: 2024-07-09
Payer: COMMERCIAL

## 2024-07-09 NOTE — TELEPHONE ENCOUNTER
Called patient and discussed result. Patient reports that he had not started atorvastatin previously. He is hesitant to start a statin. Patient reports weight loss of about 15 lbs since this spring and has been working hard on health diet. He would like to consider rechecking lipid panel prior to making any decisions on starting a statin.    He is asking that we send recommendations and messages through [employer].      ----- Message from Ximena Howell sent at 7/8/2024 11:18 AM CDT -----  Coronary CT is back and shows 42% risk for age and gender.  Thus, I do recommend we increase his LIpitor to 40mg daily, please see if agreeable and then I recommend we redraw lipid panel in three months.    Route back to me if patient agreeable to place orders.    Ximena Howell PA-C

## 2025-01-28 ENCOUNTER — PATIENT OUTREACH (OUTPATIENT)
Dept: CARE COORDINATION | Facility: CLINIC | Age: 60
End: 2025-01-28
Payer: COMMERCIAL

## 2025-02-11 ENCOUNTER — PATIENT OUTREACH (OUTPATIENT)
Dept: CARE COORDINATION | Facility: CLINIC | Age: 60
End: 2025-02-11
Payer: COMMERCIAL

## 2025-03-29 ENCOUNTER — HEALTH MAINTENANCE LETTER (OUTPATIENT)
Age: 60
End: 2025-03-29

## 2025-05-25 ENCOUNTER — NURSE TRIAGE (OUTPATIENT)
Dept: NURSING | Facility: CLINIC | Age: 60
End: 2025-05-25
Payer: COMMERCIAL

## 2025-05-26 ENCOUNTER — HOSPITAL ENCOUNTER (EMERGENCY)
Facility: HOSPITAL | Age: 60
Discharge: HOME | End: 2025-05-26
Admitting: PHYSICIAN ASSISTANT
Payer: COMMERCIAL

## 2025-05-26 ENCOUNTER — APPOINTMENT (OUTPATIENT)
Dept: ULTRASOUND IMAGING | Facility: HOSPITAL | Age: 60
End: 2025-05-26
Attending: PHYSICIAN ASSISTANT
Payer: COMMERCIAL

## 2025-05-26 VITALS
OXYGEN SATURATION: 97 % | SYSTOLIC BLOOD PRESSURE: 110 MMHG | DIASTOLIC BLOOD PRESSURE: 58 MMHG | HEIGHT: 72 IN | BODY MASS INDEX: 27.63 KG/M2 | TEMPERATURE: 99.6 F | WEIGHT: 204 LBS | RESPIRATION RATE: 14 BRPM | HEART RATE: 67 BPM

## 2025-05-26 DIAGNOSIS — L03.116 CELLULITIS OF LEFT LOWER EXTREMITY: ICD-10-CM

## 2025-05-26 LAB
A PHAGOCYTOPH DNA BLD QL NAA+PROBE: NOT DETECTED
ANION GAP SERPL CALCULATED.3IONS-SCNC: 10 MMOL/L (ref 7–15)
BABESIA DNA BLD QL NAA+PROBE: NOT DETECTED
BASOPHILS # BLD AUTO: 0 10E3/UL (ref 0–0.2)
BASOPHILS NFR BLD AUTO: 0 %
BUN SERPL-MCNC: 19.7 MG/DL (ref 8–23)
CALCIUM SERPL-MCNC: 8.6 MG/DL (ref 8.8–10.4)
CHLORIDE SERPL-SCNC: 102 MMOL/L (ref 98–107)
CREAT SERPL-MCNC: 1.16 MG/DL (ref 0.67–1.17)
CRP SERPL-MCNC: 179 MG/L
EGFRCR SERPLBLD CKD-EPI 2021: 72 ML/MIN/1.73M2
EHRLICHIA DNA SPEC QL NAA+PROBE: NOT DETECTED
EOSINOPHIL # BLD AUTO: 0 10E3/UL (ref 0–0.7)
EOSINOPHIL NFR BLD AUTO: 0 %
ERYTHROCYTE [DISTWIDTH] IN BLOOD BY AUTOMATED COUNT: 13 % (ref 10–15)
GLUCOSE SERPL-MCNC: 131 MG/DL (ref 70–99)
HCO3 SERPL-SCNC: 24 MMOL/L (ref 22–29)
HCT VFR BLD AUTO: 41.2 % (ref 40–53)
HGB BLD-MCNC: 13.9 G/DL (ref 13.3–17.7)
IMM GRANULOCYTES # BLD: 0.1 10E3/UL
IMM GRANULOCYTES NFR BLD: 1 %
LACTATE SERPL-SCNC: 0.9 MMOL/L (ref 0.7–2)
LYMPHOCYTES # BLD AUTO: 0.7 10E3/UL (ref 0.8–5.3)
LYMPHOCYTES NFR BLD AUTO: 7 %
MCH RBC QN AUTO: 30.1 PG (ref 26.5–33)
MCHC RBC AUTO-ENTMCNC: 33.7 G/DL (ref 31.5–36.5)
MCV RBC AUTO: 89 FL (ref 78–100)
MONOCYTES # BLD AUTO: 0.7 10E3/UL (ref 0–1.3)
MONOCYTES NFR BLD AUTO: 7 %
NEUTROPHILS # BLD AUTO: 8.4 10E3/UL (ref 1.6–8.3)
NEUTROPHILS NFR BLD AUTO: 85 %
NRBC # BLD AUTO: 0 10E3/UL
NRBC BLD AUTO-RTO: 0 /100
PLATELET # BLD AUTO: 221 10E3/UL (ref 150–450)
POTASSIUM SERPL-SCNC: 3.7 MMOL/L (ref 3.4–5.3)
PROCALCITONIN SERPL IA-MCNC: 0.22 NG/ML
RBC # BLD AUTO: 4.62 10E6/UL (ref 4.4–5.9)
SODIUM SERPL-SCNC: 136 MMOL/L (ref 135–145)
WBC # BLD AUTO: 9.9 10E3/UL (ref 4–11)

## 2025-05-26 PROCEDURE — 96365 THER/PROPH/DIAG IV INF INIT: CPT

## 2025-05-26 PROCEDURE — 83605 ASSAY OF LACTIC ACID: CPT | Performed by: PHYSICIAN ASSISTANT

## 2025-05-26 PROCEDURE — 93971 EXTREMITY STUDY: CPT | Mod: LT

## 2025-05-26 PROCEDURE — 85014 HEMATOCRIT: CPT | Performed by: PHYSICIAN ASSISTANT

## 2025-05-26 PROCEDURE — 84145 PROCALCITONIN (PCT): CPT | Performed by: PHYSICIAN ASSISTANT

## 2025-05-26 PROCEDURE — 87040 BLOOD CULTURE FOR BACTERIA: CPT | Performed by: PHYSICIAN ASSISTANT

## 2025-05-26 PROCEDURE — 86140 C-REACTIVE PROTEIN: CPT | Performed by: PHYSICIAN ASSISTANT

## 2025-05-26 PROCEDURE — 258N000003 HC RX IP 258 OP 636: Performed by: PHYSICIAN ASSISTANT

## 2025-05-26 PROCEDURE — 86618 LYME DISEASE ANTIBODY: CPT | Performed by: PHYSICIAN ASSISTANT

## 2025-05-26 PROCEDURE — 99285 EMERGENCY DEPT VISIT HI MDM: CPT | Mod: 25

## 2025-05-26 PROCEDURE — 80048 BASIC METABOLIC PNL TOTAL CA: CPT | Performed by: PHYSICIAN ASSISTANT

## 2025-05-26 PROCEDURE — 87798 DETECT AGENT NOS DNA AMP: CPT | Performed by: PHYSICIAN ASSISTANT

## 2025-05-26 PROCEDURE — 96361 HYDRATE IV INFUSION ADD-ON: CPT

## 2025-05-26 PROCEDURE — 250N000011 HC RX IP 250 OP 636: Performed by: PHYSICIAN ASSISTANT

## 2025-05-26 PROCEDURE — 36415 COLL VENOUS BLD VENIPUNCTURE: CPT | Performed by: PHYSICIAN ASSISTANT

## 2025-05-26 RX ORDER — DOXYCYCLINE 100 MG/1
100 CAPSULE ORAL 2 TIMES DAILY
Qty: 14 CAPSULE | Refills: 0 | Status: SHIPPED | OUTPATIENT
Start: 2025-05-26 | End: 2025-05-26

## 2025-05-26 RX ORDER — CEPHALEXIN 500 MG/1
500 CAPSULE ORAL 4 TIMES DAILY
Qty: 28 CAPSULE | Refills: 0 | Status: SHIPPED | OUTPATIENT
Start: 2025-05-26

## 2025-05-26 RX ORDER — CEPHALEXIN 500 MG/1
500 CAPSULE ORAL 4 TIMES DAILY
Qty: 28 CAPSULE | Refills: 0 | Status: SHIPPED | OUTPATIENT
Start: 2025-05-26 | End: 2025-05-26

## 2025-05-26 RX ORDER — DOXYCYCLINE 100 MG/1
100 CAPSULE ORAL 2 TIMES DAILY
Qty: 14 CAPSULE | Refills: 0 | Status: SHIPPED | OUTPATIENT
Start: 2025-05-26

## 2025-05-26 RX ORDER — PIPERACILLIN SODIUM, TAZOBACTAM SODIUM 3; .375 G/15ML; G/15ML
3.38 INJECTION, POWDER, LYOPHILIZED, FOR SOLUTION INTRAVENOUS ONCE
Status: COMPLETED | OUTPATIENT
Start: 2025-05-26 | End: 2025-05-26

## 2025-05-26 RX ADMIN — PIPERACILLIN AND TAZOBACTAM 3.38 G: 3; .375 INJECTION, POWDER, FOR SOLUTION INTRAVENOUS at 10:43

## 2025-05-26 RX ADMIN — SODIUM CHLORIDE 1000 ML: 0.9 INJECTION, SOLUTION INTRAVENOUS at 09:24

## 2025-05-26 ASSESSMENT — ACTIVITIES OF DAILY LIVING (ADL)
ADLS_ACUITY_SCORE: 41

## 2025-05-26 ASSESSMENT — COLUMBIA-SUICIDE SEVERITY RATING SCALE - C-SSRS
1. IN THE PAST MONTH, HAVE YOU WISHED YOU WERE DEAD OR WISHED YOU COULD GO TO SLEEP AND NOT WAKE UP?: NO
2. HAVE YOU ACTUALLY HAD ANY THOUGHTS OF KILLING YOURSELF IN THE PAST MONTH?: NO
6. HAVE YOU EVER DONE ANYTHING, STARTED TO DO ANYTHING, OR PREPARED TO DO ANYTHING TO END YOUR LIFE?: NO

## 2025-05-26 NOTE — DISCHARGE INSTRUCTIONS
Your laboratory workup is reassuring.  Ultrasound does not show any evidence of a blood clot.  I suspect that this is a soft tissue infection called cellulitis.    You are given your first dose of IV antibiotics here in the ER.  I have sent you home with 2 different antibiotics to take at home over the next 7 days.  1 needs to be taken 4 times a day and this is called cephalexin AKA Keflex.  The other needs to be taken twice a day and is called doxycycline.    Please monitor the area closely.  If you have significant spreading of the redness/swelling, you need to be reevaluated in the emergency department.  It is okay if the redness extends outside of the line slightly over the next 24 hours, but it should not continue to expand after 24 hours.    You should also be reevaluated if symptoms are not improving any over the next couple of days.    Continue with Tylenol and ibuprofen as needed.  Elevate the lower extremity to help with swelling.  You can also use ice as desired.

## 2025-05-26 NOTE — TELEPHONE ENCOUNTER
"Nurse Triage SBAR    Is this a 2nd Level Triage? YES, LICENSED PRACTITIONER REVIEW IS REQUIRED    Situation: Left lower leg pain, edema, red, tender to touch    Background: Left shin pain and edema    Assessment:   -Swelling in Left foot started yesterday  -New in last 2 hours: Pain (5/10)upper foot and extending now into Shin, Red, Very warm and tender to touch, swollen, shiny \"tight\".   -No pitting edema, + pedal pulse  -Temperature 100.4 (oral)  -Right leg much cooler  -Chills, neck stiffness  -Ibuprofen x2 just now, and Tylenol x 2 pills this afternoon  -Recent travel to [place]  -Old abrasion    Protocol Recommended Disposition:   Go to ED Now (Or PCP Triage)    Recommendation:   2LT Care advice reviewed. Patient verbalized understanding and agreed to follow care advice given. Advised to call back with any new signs or symptoms, Patient agreed      [facility] Provider consult indicated.    Reason for page: Left lower shin pain and edema    Specialty Group number: [id number]   Specialty Group: FM-Family Medicine    Initial call made to PAULO Caceres MD  by Answering Service at [phone].     Nicolle Navarrete RN    [facility] Provider, PAULO Caceres MD , connected to Nurse Advisors at [phone]    Provider recommended plan of care:   -\"Go to ER now to be evaluated\"    Provider Recommendation Follow Up:   Reached patient/caregiver. Informed of provider's recommendations. Patient verbalized understanding and agrees with the plan.           Nicolle Navarrete RN                 Reason for Disposition   [1] Red area or streak AND [2] fever    Additional Information   Negative: Looks like a broken bone or dislocated joint (e.g., crooked or deformed)   Negative: Sounds like a life-threatening emergency to the triager   Negative: Followed a leg injury   Negative: Leg swelling is main symptom   Negative: Back pain radiating (shooting) into leg(s)   Negative: Knee pain is main " symptom   Negative: Ankle pain is main symptom   Negative: Pregnant   Negative: Postpartum (from 0 to 6 weeks after delivery)   Negative: Chest pain   Negative: Difficulty breathing   Negative: Entire foot is cool or blue in comparison to other side   Negative: Unable to walk    Protocols used: Leg Pain-A-AH  Nicolle Navarrete RN, Triage Nurse Advisor, 5/25/2025 11:05 PM

## 2025-05-26 NOTE — ED PROVIDER NOTES
EMERGENCY DEPARTMENT ENCOUNTER   NAME: Cristian Escobar ; AGE: 59 year old male ; YOB: 1965 ; MRN: [id number] ; PCP: Ximena Howell     Evaluation Date & Time: 5/26/2025  8:53 AM    ED Provider: Barbara Abebe PA-C    CHIEF COMPLAINT     Leg Swelling and Fever      FINAL ASSESSMENT       ICD-10-CM    1. Cellulitis of left lower extremity  L03.116           ED COURSE, MEDICAL DECISION MAKING, PLAN     ED course/notable events     9:01 AM Evaluated patient.   10:28 AM Rechecked and updated patient. Will given IV antibiotics here and then discharge to home with oral antibiotics. Discharge plans and follow up discussed.   ______________________________________________________________________    Reason for visit   Cristian Escobar is a 59 year old male with no pertinent PMH presenting for left lower extremity swelling, redness, pain, and fevers.  Started with left leg swelling a couple of days ago.  To note, patient just returned home from [place] just over a week ago where he was on vacation.    Exam positives   Left lower extremity slightly swollen compared to the right side from the top of the foot up to just below the knee.  There is redness in the same area, increased tissue warmth, and palpable pain.  There is a small patch of petechiae that is nonblanching to the lateral mid shin.  The rest of the exam is benign.    Vitals   Heart rate slightly elevated at 100, temp minimally increased at 99.6, but otherwise vitally normal.  This did normalize after some fluids.    Labs   CRP is elevated at 179, otherwise blood work is reassuring.  Blood cultures pending at time of discharge.  Tickborne illness screening are also pending at time of discharge.    EKG   /    Imaging   Left lower extremity venous ultrasound  Radiologist read: 1.  No deep venous thrombosis in the left lower extremity.  2.  Prominent left inguinal lymph nodes which are favored reactive    Interventions/recheck   Declined  analgesics here.     Medications   sodium chloride 0.9% BOLUS 1,000 mL (0 mLs Intravenous Stopped 5/26/25 1018)   piperacillin-tazobactam (ZOSYN) 3.375 g vial to attach to  mL bag (0 g Intravenous Stopped 5/26/25 1113)       Procedures  /    Consults   /    Assessment   Cellulitis of left lower extremity     Possible microtrauma while out hiking in [place] recently.  Considered the possibility of some sort of insect bite.    Laboratory workup is overall reassuring.  He does have an elevated CRP at 179, but no leukocytosis, no elevated lactic acid, and no elevated procalcitonin.    Venous ultrasound completed showing no evidence of DVT.  There is a prominent inguinal lymph node, but this is likely reactive to the cellulitis.    No evidence to suggest compartment syndrome with soft tissues.  No evidence of necrotizing fasciitis with no pain out of proportion and no rapidly progressing symptoms.    Although there is a small patch of petechiae, I do not think this rash represents a vascular rash.  The small petechial rash is likely secondary to the swelling.  Platelets within normal limits.    Very low concern for more nefarious etiology of the lower extremity swelling, redness, and pain.  Certainly not consistent with Herrera-Landon syndrome (no skin desquamation, mucous membrane involvement, offending medications), Staphylococcus scalded skin syndrome (localized rash), Isaac mountain spotted fever, vasculitis (blanchable skin), etc.     He was given a dose of IV Zosyn here and we will send him home with 7-day course of Keflex and doxycycline.    Strict monitoring and return precautions reviewed with patient and patient's wife.    Overall, no red flag signs or symptoms present to suggest an acutely serious or life-threatening condition that would warrant further evaluation/hospitalization.  I did discuss the option of admission with patient given the large area of cellulitis, but ultimately he wishes to  "discharge home which I think is reasonable as he is overall well appearing with reassuring labs and vitals.      Plan   -Disposition: Considered admission, but ultimately patient discharged after shared decision making.    -Prescription(s) provided:   Discharge Medication List as of 5/26/2025 11:33 AM        START taking these medications    Details   doxycycline hyclate (VIBRAMYCIN) 100 MG capsule Take 1 capsule (100 mg) by mouth 2 times daily for 7 days., Disp-14 capsule, R-0, E-Prescribe              -Referral(s)/specialist contact information given: /    -Recommendations: Recommended symptomatic cares including acetaminophen as needed, NSAIDs as needed, and ice/heat Recommended arranging a follow up with PCP.      Indications for re-evaluation in the ER discussed.   Patient/parent/guardian understanding and agreeable with the plan and will discharge to home in good condition.       Medical decision making factors  Independent historian(s): spouse  Care impacted by chronic illnesses: None  External records reviewed: N/A  Independent interpretation: Independently interpreted labs as charted above under \"labs.\"   Consults: N/A  Disposition: See above under \"plan\"  Prescriptions: Yes. See above under \"prescription(s) provided.\"   MIPS: Not Applicable  Critical care: N/A  Sepsis: None         HISTORY OF PRESENT ILLNESS   Patient information was obtained from: Patient  and Spouse   Use of Intrepreter: None     Pertinent past medical history: none     Cristian Escobar is here by means of walk in  with spouse  for evaluation of left lower extremity pain, redness, swelling over the last handful of days.    States it started out as a little bit of swelling to the top of his foot that then progressed to some discomfort and redness in the leg.    Yesterday patient started feeling a bit under the weather with some chills and just generally feeling unwell.  When he checked his temperature it was 100.8.  Started using Tylenol " for symptoms.    Patient's spouse reports that they returned home from a trip to [place] just over a week ago.  While they were in [place] they did a bunch of hiking.  He did not have any type of injury or insect bite that he can recall while there.    Patient denies any history of blood clots, cellulitis, MRSA.     States he is overall healthy and takes no daily medication.    No other concerns.      PHYSICAL EXAM     First Vitals:  Patient Vitals for the past 24 hrs:   BP Temp Temp src Pulse Resp SpO2 Height Weight   05/26/25 1130 110/58 -- -- 67 -- 97 % -- --   05/26/25 1115 -- -- -- 65 -- 96 % -- --   05/26/25 1100 107/56 -- -- 69 -- 96 % -- --   05/26/25 1045 -- -- -- 68 -- 95 % -- --   05/26/25 1030 107/57 -- -- 66 -- 96 % -- --   05/26/25 1015 107/59 -- -- 68 14 95 % -- --   05/26/25 0848 107/59 99.6  F (37.6  C) Oral 100 16 95 % 1.829 m (6') 92.5 kg (204 lb)       PHYSICAL EXAM:   Constitutional: No acute distress.  Neuro: Awake and alert. No focal deficits.  Psych: Calm and cooperative.  Eyes: PERRL. EOMI. Conjunctivae clear.     Cardio: Regular rate. Adequate perfusion to extremities. Regular rhythm. No murmurs.  Pulmonary: Oxygenating well on RA. No labored breathing. No wheezes. No rales. No rhonchi.    Upper extremities: Moves freely. No edema.  Lower extremities: Left lower extremity is slightly swollen compared to the right. There is redness from the top of the foot to just below the knee mostly on the anterolateral surface of the lower leg. Small patch of petechia to the lateral shin that is non-blanching. Hot to the touch. Tender especially just above the ankle medially. Moves freely. Distal pulses intact. Sensations intact.   Skin: See description above under lower extremities and photos below. All other skin Natural color, warm, dry, intact.                 MEDICAL HISTORY     No past medical history on file.    Past Surgical History:   Procedure Laterality Date    COLONOSCOPY N/A 9/5/2017     Procedure: COLONOSCOPY;  Colonoscopy;  Surgeon: Matthew Kent MD;  Location: [facility]       Family History   Problem Relation Age of Onset    Cerebrovascular Disease Mother         Multiple small strokes, also incidents of low BP., syncopy, multiple system atrophy.    Osteoporosis Mother     C.A.D. Father         history of MI    Alzheimer Disease Father     Diabetes Father         Type II Late in Life    Cancer Brother         Renal cell carcinoma ?von Hippel Lindau Syndrome?    Other Cancer Brother         Kidney Cancer    Breast Cancer No family hx of     Cancer - colorectal No family hx of     Prostate Cancer No family hx of        Social History     Tobacco Use    Smoking status: Never     Passive exposure: Never    Smokeless tobacco: Never   Vaping Use    Vaping status: Never Used   Substance Use Topics    Alcohol use: Yes    Drug use: No       Medications   cephALEXin (KEFLEX) 500 MG capsule  doxycycline hyclate (VIBRAMYCIN) 100 MG capsule  atorvastatin (LIPITOR) 20 MG tablet        RESULTS     LAB:  All pertinent labs reviewed and interpreted  Labs Ordered and Resulted from Time of ED Arrival to Time of ED Departure   BASIC METABOLIC PANEL - Abnormal       Result Value    Sodium 136      Potassium 3.7      Chloride 102      Carbon Dioxide (CO2) 24      Anion Gap 10      Urea Nitrogen 19.7      Creatinine 1.16      GFR Estimate 72      Calcium 8.6 (*)     Glucose 131 (*)    CRP INFLAMMATION - Abnormal    CRP Inflammation 179.00 (*)    CBC WITH PLATELETS AND DIFFERENTIAL - Abnormal    WBC Count 9.9      RBC Count 4.62      Hemoglobin 13.9      Hematocrit 41.2      MCV 89      MCH 30.1      MCHC 33.7      RDW 13.0      Platelet Count 221      % Neutrophils 85      % Lymphocytes 7      % Monocytes 7      % Eosinophils 0      % Basophils 0      % Immature Granulocytes 1      NRBCs per 100 WBC 0      Absolute Neutrophils 8.4 (*)     Absolute Lymphocytes 0.7 (*)     Absolute Monocytes 0.7      Absolute Eosinophils  0.0      Absolute Basophils 0.0      Absolute Immature Granulocytes 0.1      Absolute NRBCs 0.0     LACTIC ACID WHOLE BLOOD WITH 1X REPEAT IN 2 HR WHEN >2 - Normal    Lactic Acid, Initial 0.9     PROCALCITONIN - Normal    Procalcitonin 0.22     LYME DISEASE TOTAL ANTIBODIES WITH REFLEX TO CONFIRMATION   TICK-BORNE DISEASE PANEL (NON-LYME) BY PCR   BLOOD CULTURE   BLOOD CULTURE       RADIOLOGY:  US Lower Extremity Venous Duplex Left   Final Result   IMPRESSION:   1.  No deep venous thrombosis in the left lower extremity.   2.  Prominent left inguinal lymph nodes which are favored reactive.                  Some or all of this documentation has been completed using dictation software and grammatical errors may be present. Please contact me with any concerns regarding this.     Barbara Abebe PA-C  Emergency Medicine   [facility] EMERGENCY DEPARTMENT       Barbara Abebe PA-C  05/26/25 6626

## 2025-05-26 NOTE — ED TRIAGE NOTES
Patient c/o leg swelling , redness, pain and fever.  Symptoms started couple of days ago.      Triage Assessment (Adult)       Row Name 05/26/25 0851          Triage Assessment    Airway WDL WDL        Respiratory WDL    Respiratory WDL WDL        Skin Circulation/Temperature WDL    Skin Circulation/Temperature WDL temperature     Skin Temperature warm        Cardiac WDL    Cardiac WDL WDL        Peripheral/Neurovascular WDL    Peripheral Neurovascular WDL WDL        Cognitive/Neuro/Behavioral WDL    Cognitive/Neuro/Behavioral WDL WDL

## 2025-05-27 LAB — B BURGDOR IGG+IGM SER QL: 0.67

## 2025-05-29 LAB
BACTERIA SPEC CULT: NORMAL
BACTERIA SPEC CULT: NORMAL

## 2025-05-31 LAB
BACTERIA SPEC CULT: NO GROWTH
BACTERIA SPEC CULT: NO GROWTH

## (undated) RX ORDER — GLYCOPYRROLATE 0.2 MG/ML
INJECTION, SOLUTION INTRAMUSCULAR; INTRAVENOUS
Status: DISPENSED
Start: 2017-09-05

## (undated) RX ORDER — LIDOCAINE HYDROCHLORIDE 10 MG/ML
INJECTION, SOLUTION EPIDURAL; INFILTRATION; INTRACAUDAL; PERINEURAL
Status: DISPENSED
Start: 2017-09-05